# Patient Record
Sex: MALE | Race: BLACK OR AFRICAN AMERICAN | NOT HISPANIC OR LATINO | RURAL
[De-identification: names, ages, dates, MRNs, and addresses within clinical notes are randomized per-mention and may not be internally consistent; named-entity substitution may affect disease eponyms.]

---

## 2021-12-21 DIAGNOSIS — M54.16 LUMBAR RADICULOPATHY: Primary | ICD-10-CM

## 2022-01-04 ENCOUNTER — HOSPITAL ENCOUNTER (EMERGENCY)
Facility: HOSPITAL | Age: 67
Discharge: HOME | End: 2022-01-04
Attending: SPECIALIST
Payer: MEDICARE

## 2022-01-04 VITALS
HEART RATE: 50 BPM | RESPIRATION RATE: 20 BRPM | HEIGHT: 72 IN | TEMPERATURE: 98 F | SYSTOLIC BLOOD PRESSURE: 123 MMHG | WEIGHT: 195 LBS | OXYGEN SATURATION: 99 % | DIASTOLIC BLOOD PRESSURE: 61 MMHG | BODY MASS INDEX: 26.41 KG/M2

## 2022-01-04 DIAGNOSIS — R55 SYNCOPE: ICD-10-CM

## 2022-01-04 DIAGNOSIS — R55 VASOVAGAL SYNCOPE: Primary | ICD-10-CM

## 2022-01-04 LAB
ALBUMIN SERPL BCP-MCNC: 3.4 G/DL (ref 3.5–5)
ALBUMIN/GLOB SERPL: 0.9 {RATIO}
ALP SERPL-CCNC: 100 U/L (ref 45–115)
ALT SERPL W P-5'-P-CCNC: 24 U/L (ref 16–61)
ANION GAP SERPL CALCULATED.3IONS-SCNC: 12 MMOL/L (ref 7–16)
AST SERPL W P-5'-P-CCNC: 20 U/L (ref 15–37)
BASOPHILS # BLD AUTO: 0.06 K/UL (ref 0–0.2)
BASOPHILS NFR BLD AUTO: 0.7 % (ref 0–1)
BILIRUB SERPL-MCNC: 0.6 MG/DL (ref 0–1.2)
BUN SERPL-MCNC: 12 MG/DL (ref 7–18)
BUN/CREAT SERPL: 9 (ref 6–20)
CALCIUM SERPL-MCNC: 8.9 MG/DL (ref 8.5–10.1)
CHLORIDE SERPL-SCNC: 102 MMOL/L (ref 98–107)
CO2 SERPL-SCNC: 29 MMOL/L (ref 21–32)
CREAT SERPL-MCNC: 1.32 MG/DL (ref 0.7–1.3)
DIFFERENTIAL METHOD BLD: ABNORMAL
EOSINOPHIL # BLD AUTO: 0.12 K/UL (ref 0–0.5)
EOSINOPHIL NFR BLD AUTO: 1.4 % (ref 1–4)
ERYTHROCYTE [DISTWIDTH] IN BLOOD BY AUTOMATED COUNT: 15.8 % (ref 11.5–14.5)
GLOBULIN SER-MCNC: 3.8 G/DL (ref 2–4)
GLUCOSE SERPL-MCNC: 141 MG/DL (ref 74–106)
HCT VFR BLD AUTO: 48.7 % (ref 40–54)
HGB BLD-MCNC: 15.4 G/DL (ref 13.5–18)
IMM GRANULOCYTES # BLD AUTO: 0.03 K/UL (ref 0–0.04)
IMM GRANULOCYTES NFR BLD: 0.4 % (ref 0–0.4)
LYMPHOCYTES # BLD AUTO: 1.65 K/UL (ref 1–4.8)
LYMPHOCYTES NFR BLD AUTO: 19.7 % (ref 27–41)
MAGNESIUM SERPL-MCNC: 2.3 MG/DL (ref 1.7–2.3)
MCH RBC QN AUTO: 27.4 PG (ref 27–31)
MCHC RBC AUTO-ENTMCNC: 31.6 G/DL (ref 32–36)
MCV RBC AUTO: 86.7 FL (ref 80–96)
MONOCYTES # BLD AUTO: 0.64 K/UL (ref 0–0.8)
MONOCYTES NFR BLD AUTO: 7.7 % (ref 2–6)
MPC BLD CALC-MCNC: 11.3 FL (ref 9.4–12.4)
NEUTROPHILS # BLD AUTO: 5.86 K/UL (ref 1.8–7.7)
NEUTROPHILS NFR BLD AUTO: 70.1 % (ref 53–65)
PLATELET # BLD AUTO: 145 K/UL (ref 150–400)
POTASSIUM SERPL-SCNC: 4 MMOL/L (ref 3.5–5.1)
PROT SERPL-MCNC: 7.2 G/DL (ref 6.4–8.2)
RBC # BLD AUTO: 5.62 M/UL (ref 4.6–6.2)
SODIUM SERPL-SCNC: 139 MMOL/L (ref 136–145)
TROPONIN I SERPL HS-MCNC: 4.6 PG/ML
WBC # BLD AUTO: 8.36 K/UL (ref 4.5–11)

## 2022-01-04 PROCEDURE — 93010 ELECTROCARDIOGRAM REPORT: CPT | Performed by: SPECIALIST

## 2022-01-04 PROCEDURE — 84484 ASSAY OF TROPONIN QUANT: CPT | Performed by: SPECIALIST

## 2022-01-04 PROCEDURE — 83735 ASSAY OF MAGNESIUM: CPT | Performed by: SPECIALIST

## 2022-01-04 PROCEDURE — 36415 COLL VENOUS BLD VENIPUNCTURE: CPT | Performed by: SPECIALIST

## 2022-01-04 PROCEDURE — 93005 ELECTROCARDIOGRAM TRACING: CPT

## 2022-01-04 PROCEDURE — 99285 EMERGENCY DEPT VISIT HI MDM: CPT | Mod: 25

## 2022-01-04 PROCEDURE — 80053 COMPREHEN METABOLIC PANEL: CPT | Performed by: SPECIALIST

## 2022-01-04 PROCEDURE — 85025 COMPLETE CBC W/AUTO DIFF WBC: CPT | Performed by: SPECIALIST

## 2022-01-04 PROCEDURE — 99284 EMERGENCY DEPT VISIT MOD MDM: CPT | Performed by: SPECIALIST

## 2022-01-04 RX ORDER — LIDOCAINE 50 MG/G
PATCH TOPICAL
COMMUNITY
Start: 2021-08-02

## 2022-01-04 RX ORDER — GABAPENTIN 300 MG/1
CAPSULE ORAL
COMMUNITY
Start: 2021-08-02

## 2022-01-04 RX ORDER — ASPIRIN 81 MG/1
TABLET ORAL
COMMUNITY
Start: 2021-08-03

## 2022-01-04 RX ORDER — ACETAMINOPHEN AND CODEINE PHOSPHATE 300; 30 MG/1; MG/1
TABLET ORAL
COMMUNITY
Start: 2021-12-10

## 2022-01-04 RX ORDER — INSULIN DEGLUDEC 200 U/ML
INJECTION, SOLUTION SUBCUTANEOUS
COMMUNITY
Start: 2021-11-16

## 2022-01-04 RX ORDER — EMPAGLIFLOZIN 25 MG/1
TABLET, FILM COATED ORAL
COMMUNITY
Start: 2021-12-07

## 2022-01-04 RX ORDER — ALOGLIPTIN 25 MG/1
TABLET, FILM COATED ORAL
COMMUNITY
Start: 2021-10-22

## 2022-01-04 RX ORDER — LINACLOTIDE 145 UG/1
CAPSULE, GELATIN COATED ORAL
COMMUNITY
Start: 2021-09-21

## 2022-01-04 RX ORDER — LISINOPRIL AND HYDROCHLOROTHIAZIDE 20; 25 MG/1; MG/1
TABLET ORAL
COMMUNITY
Start: 2021-11-09

## 2022-01-04 RX ORDER — CHOLECALCIFEROL (VITAMIN D3) 25 MCG
TABLET ORAL
COMMUNITY
Start: 2021-12-07

## 2022-01-04 NOTE — ED PROVIDER NOTES
Encounter Date: 1/4/2022       History     Chief Complaint   Patient presents with    Loss of Consciousness     C/O SYNCOPAL EPISODE WHILE ON TOILET-BECAME NAUSEATED AND BROKE OUT IN SWEAT -SPOUSE FOUND ON TOILET      Patient is a 67 yo AA male who was sitting on the toilet having a bowel movement when he fainted.  He was brought in by EMS for evaluation and treatment. Patient has a history of stroke.  Wife is in the room with patient.  He denies any preceding chest pain or n/v or dizziness.  He does not remember exactly what occurred prior to incident.  Patient appears well and is in NAD.        Review of patient's allergies indicates:  No Known Allergies  Past Medical History:   Diagnosis Date    Diabetes mellitus     Hypertension     Stroke      Past Surgical History:   Procedure Laterality Date    CHOLECYSTECTOMY       History reviewed. No pertinent family history.  Social History     Tobacco Use    Smoking status: Never Smoker    Smokeless tobacco: Never Used   Substance Use Topics    Alcohol use: Never    Drug use: Never     Review of Systems   Constitutional: Negative.    HENT: Negative.    Eyes: Negative.    Respiratory: Negative.    Cardiovascular: Negative.    Gastrointestinal: Negative.    Endocrine: Negative.    Genitourinary: Negative.    Musculoskeletal: Negative.    Allergic/Immunologic: Negative.    Neurological: Positive for syncope.   Hematological: Negative.    Psychiatric/Behavioral: Negative.        Physical Exam     Initial Vitals [01/04/22 1310]   BP Pulse Resp Temp SpO2   133/73 67 20 98.2 °F (36.8 °C) 99 %      MAP       --         Physical Exam    Constitutional: He appears well-developed and well-nourished. He is not diaphoretic. No distress.   HENT:   Head: Normocephalic and atraumatic.   Eyes: EOM are normal. Pupils are equal, round, and reactive to light.   Neck: Neck supple.   Normal range of motion.  Cardiovascular: Normal rate.   Pulmonary/Chest: Breath sounds normal.    Musculoskeletal:         General: Normal range of motion.      Cervical back: Normal range of motion and neck supple.     Neurological: He is alert and oriented to person, place, and time. He has normal strength.   Skin: Skin is warm.   Psychiatric: He has a normal mood and affect. His behavior is normal.         Medical Screening Exam   See Full Note    ED Course   Procedures  Labs Reviewed   CBC W/ AUTO DIFFERENTIAL    Narrative:     The following orders were created for panel order CBC auto differential.  Procedure                               Abnormality         Status                     ---------                               -----------         ------                     CBC with Differential[[id number]]                                                         Please view results for these tests on the individual orders.   COMPREHENSIVE METABOLIC PANEL   MAGNESIUM   TROPONIN I   CBC WITH DIFFERENTIAL   URINALYSIS, REFLEX TO URINE CULTURE   DRUG SCREEN, URINE (BEAKER)        ECG Results          EKG 12-lead (In process)  Result time 01/04/22 13:36:13    In process by Interface, Lab In [facility] (01/04/22 13:36:13)                 Narrative:    Test Reason : R55,    Vent. Rate : 055 BPM     Atrial Rate : 055 BPM     P-R Int : 152 ms          QRS Dur : 094 ms      QT Int : 398 ms       P-R-T Axes : 065 073 077 degrees     QTc Int : 380 ms    Sinus bradycardia  Otherwise normal ECG  No previous ECGs available    Referred By: [email or web address]   SELF           Confirmed By:                             Imaging Results          CT Head Without Contrast (Final result)  Result time 01/04/22 13:38:47    Final result by Diogenes Montaño MD (01/04/22 13:38:47)                 Impression:      No acute intracranial abnormality.      Electronically signed by: Diogenes Montaño  Date:    01/04/2022  Time:    13:38             Narrative:    EXAMINATION:  CT HEAD WITHOUT CONTRAST    CLINICAL HISTORY:  syncope;    TECHNIQUE:  Low dose axial  images were obtained through the head.  Coronal and sagittal reformations were also performed. Contrast was not administered.    COMPARISON:  None.    FINDINGS:  No acute intracranial hemorrhage identified.  No mass effect or midline shift.  No large vessel infarct.  Mild chronic microvascular ischemic changes are seen.  Vascular calcifications.  Calvarium intact.  Mastoid air cells clear.                                 Medications - No data to display                    Clinical Impression:   Final diagnoses:  [R55] Syncope  [R55] Vasovagal syncope (Primary)          ED Disposition Condition    Discharge Stable        ED Prescriptions     None        Follow-up Information     Follow up With Specialties Details Why Contact Info    Brent Roy MD Family Medicine In 1 day If symptoms worsen [address] [facility] [address]  [phone]             Stefanie Mendoza MD  01/04/22 0520

## 2022-01-05 ENCOUNTER — CLINICAL SUPPORT (OUTPATIENT)
Dept: REHABILITATION | Facility: HOSPITAL | Age: 67
End: 2022-01-05
Payer: MEDICARE

## 2022-01-05 ENCOUNTER — TELEPHONE (OUTPATIENT)
Dept: EMERGENCY MEDICINE | Facility: HOSPITAL | Age: 67
End: 2022-01-05
Payer: MEDICARE

## 2022-01-05 DIAGNOSIS — G89.29 CHRONIC BILATERAL LOW BACK PAIN WITH LEFT-SIDED SCIATICA: ICD-10-CM

## 2022-01-05 DIAGNOSIS — M54.16 LUMBAR RADICULOPATHY: ICD-10-CM

## 2022-01-05 DIAGNOSIS — M54.42 CHRONIC BILATERAL LOW BACK PAIN WITH LEFT-SIDED SCIATICA: ICD-10-CM

## 2022-01-05 PROBLEM — M54.50 LOW BACK PAIN: Status: ACTIVE | Noted: 2022-01-05

## 2022-01-05 PROCEDURE — 97140 MANUAL THERAPY 1/> REGIONS: CPT

## 2022-01-05 PROCEDURE — 97161 PT EVAL LOW COMPLEX 20 MIN: CPT

## 2022-01-05 PROCEDURE — 97110 THERAPEUTIC EXERCISES: CPT

## 2022-01-05 PROCEDURE — 97014 ELECTRIC STIMULATION THERAPY: CPT

## 2022-01-05 NOTE — PLAN OF CARE
[facility]   Physical Therapy Initial Evaluation    Name: Alen Lam  Clinic Number: [id number]    Therapy Diagnosis: No diagnosis found.  Physician: Carlota Murray FNP    Physician Orders: PT Eval and Treat   Medical Diagnosis from Referral: low back pain   Evaluation Date: 1/5/2022  Authorization Period Expiration: 1/5/2023  Plan of Care Expiration: 2/16/2022  Visit # / Visits authorized: 1/ 12    Time In: 8:10  Time Out: 9:19  Total Appointment Time (timed & untimed codes): 69 minutes    Precautions: Standard    Subjective   Date of onset: chronic   History of current condition - Alen reports: chronic history of low back pain for multiple years. He states that he receives injections in his low back and they help his pain; however, the injections are starting to not last as long. Patient reports increased intensity of pain and increased radiating pain in LLE that has begun to limit his activity tolerance. Patient is scheduled for another injection in 3 months. Return to MD date: March 2022     Medical History:   Past Medical History:   Diagnosis Date    Diabetes mellitus     Hypertension     Stroke        Surgical History:   Alen Lam  has a past surgical history that includes Cholecystectomy.    Medications:   Alen has a current medication list which includes the following prescription(s): acetaminophen-codeine 300-30mg, alogliptin, aspirin, gabapentin, jardiance, lidocaine, linzess, lisinopril-hydrochlorothiazide, tresiba flextouch u-200, and vitamin d.    Allergies:   Review of patient's allergies indicates:  No Known Allergies     Imaging, X-rays    Prior Therapy: Physical Therapy completed at this facility in the past for low back pain   Social History:  lives with their family  Occupation: Disabled   Prior Level of Function: Independent   Current Level of Function: Independent with pain     Pain:  Current 5/10, worst 10/10, best 2/10   Location: low back and L LE   Description:  "Aching and Sharp  Aggravating Factors: Sitting, Standing, Bending and Walking  Easing Factors: pain medication and injection    Pts goals: decrease pain to increase activity tolerance     Objective     Posture:  1. Standing lordosis: Decreased  2. Sitting lordosis: Decreased  3. Iliac crest height: left increased  4. PSIS height: left increased  5. Pelvic rotation/torsion: yes  6. Scoliosis: no  7. Lateral shift: None     Forward bend:WFL with pain   Back bend: WFL with pain   Lateral trunk lean: right decreased by approximately 50% due to pain and muscle tightness left decreased by 35%       MANUAL MUSCLE TEST  Right left   Hip flexion MMT number: 4/5 MMT strength: 4-/5   Hip abduction MMT strength: 4+/5 MMT strength: 4-/5   Hip Extension  MMT number: 4-/5 MMT number: 3+/5     ROM/flexibility right left   Hamstring 90/90 (-10) -10  -10      Special test Right  Left    SLR test < 60 degrees Negative Negative   SLR test > 60 degrees Positive for pain in HS only  Positive   Sitting slump test Positive Positive   Piriformis test Positive Positive   JOURDAN test Positive Positive   SI distraction Negative Negative   SI compression Positive Positive     Gait assessment:   Antalgic gait: yes  Assistive device: none    Spinal mobility:  Segment: Lumbar and sacral mobility decreased with posterior/anterior mobilization     Palpation: Muscle tightness and pain with palpation of B QL muscles and lumbar paraspinals, L piriformis muscle tightness and pain on palpation       TREATMENT   Treatment Time In: 8:22  Treatment Time Out: 9:19  Total Treatment time (time-based codes) separate from Evaluation:57  minutes    Corral received the treatments listed below:  THERAPEUTIC EXERCISES to develop flexibility, posture and core stabilization for 27 minutes including :See flowsheet below    Ther-Ex Reps    Nustep 8 mins    Wedge 1 mins    Hamstring Stretch 2 x 20" each    Posterior Pelvic Tilts 20 x 3"    Lower Trunk Rotations 5 x 10" " "each side   Single Knee to Chest Stretch 5 x 10" each side    Piriformis Stretch 2 x 30" each side    Figure 4 Stretch 2 x 30" each side    Hip Adduction -----   Hip Abduction  ------                       MANUAL THERAPY TECHNIQUES including Joint mobilizations and active neuromuscular releases  were applied to L innominate, L QL, L piriformis, and lumbar spine  for 18 minutes to improve pelvic alignment and lumbar mobility.     SUPERVISED MODALITIES after being cleared for contraindications:  TENS:  Alen received Biphasic electrical stimulation for pain to the B lumbosacral muscles. Pt received burst mode at a rate of 2 pps for 12 minutes. Alen tolerated treatment well without any adverse effects.     Hot Pack for 12 minutes to B lumbosacral muscles with ESTIM.    Home Exercises and Patient Education Provided        Education provided:   - PT provided education on body mechanics, activity modifications, and sleeping positions to decrease pain.     Written Home Exercises Provided: yes.  Exercises were reviewed and Alen was able to demonstrate them prior to the end of the session.  Alen demonstrated good  understanding of the education provided.     See EMR under Patient Instructions for exercises provided 1/5/2022.    Assessment   Alen is a 66 y.o. male referred to outpatient Physical Therapy with a medical diagnosis of lumbar radiculopathy. Pt presents with low back and L LE pain, muscle tightness, and LE and core muscle weakness.Patient's impairments have begun to limit his overall mobility and activity tolerance to ADLs.   Pt prognosis is Fair.   Pt will benefit from skilled outpatient Physical Therapy to address the deficits stated above and in the chart below, provide pt/family education, and to maximize pt's level of independence.     PT provided verbal, visual, and tactile cueing for posture, form and correct hold times with ther-ex today. Patient presents with limited hip mobility B (L>R). Patient " had no reports of increased pain with ther-ex today only reports of tissue stretching. PT noted improved pelvic alignment, spinal mobility, and decreased tissue tightness following manual therapy. Patient reported pain of 2/10 at end of treatment session.     Plan of care discussed with patient: Yes  Pt's spiritual, cultural and educational needs considered and patient is agreeable to the plan of care and goals as stated below:     Anticipated Barriers for therapy: chronicity of pain, guarded with exercise, and sedentary lifestyle.      Goals:  1. Patient will report decrease in pain to 3/10 to improve quality of life.   2. Patient will report decrease in radiating occurrences from L foot  to L thigh  to allow patient the ability to perform activities of daily living   3. Patient will increase B lower extremity strength to 4+/5 to improve ambulation ability  4. Patient will increase B hamstring 90/90 by 10 degrees      Plan   Plan of care Certification: 1/5/2022 to 2/16/2022     Outpatient Physical Therapy 2 times weekly for 6 weeks to include the following interventions: Electrical Stimulation unattend, Manual Therapy, Moist Heat/ Ice, Patient Education, Therapeutic Activities, Therapeutic Exercise and Ultrasound.     Flaquito Lobo, PT,DPT     Physician Signature : ____________________________________________________  Date:_______________________________________________

## 2022-01-11 ENCOUNTER — CLINICAL SUPPORT (OUTPATIENT)
Dept: REHABILITATION | Facility: HOSPITAL | Age: 67
End: 2022-01-11
Payer: MEDICARE

## 2022-01-11 DIAGNOSIS — G89.29 CHRONIC BILATERAL LOW BACK PAIN WITH LEFT-SIDED SCIATICA: ICD-10-CM

## 2022-01-11 DIAGNOSIS — M54.42 CHRONIC BILATERAL LOW BACK PAIN WITH LEFT-SIDED SCIATICA: ICD-10-CM

## 2022-01-11 PROCEDURE — 97140 MANUAL THERAPY 1/> REGIONS: CPT | Mod: CQ

## 2022-01-11 PROCEDURE — 97110 THERAPEUTIC EXERCISES: CPT | Mod: CQ

## 2022-01-11 PROCEDURE — 97014 ELECTRIC STIMULATION THERAPY: CPT | Mod: CQ

## 2022-01-11 NOTE — PROGRESS NOTES
"[facility]   Physical Therapy Treatment Note     Name: Alen Lam  Clinic Number: [id number]    Therapy Diagnosis:   Encounter Diagnosis   Name Primary?    Chronic bilateral low back pain with left-sided sciatica      Physician: Carlota Murray FNP  Physician Orders: PT Eval and Treat   Medical Diagnosis from Referral: low back pain   Evaluation Date: 1/5/2022  Authorization Period Expiration: 1/5/2023  Plan of Care Expiration: 2/16/2022  Visit # / Visits authorized: 2/12     Time In: 14:02  Time Out:  15:15  Total Appointment Time (timed & untimed codes): 73 minutes      Precautions: Standard    Visit Date: 1/11/2022    PTA Visit #: 1/5        SUBJECTIVE     Pt reports: Patient stated that he is experiencing some low back pain today and he has been feel fatigue from medication he has been taking. The pt rated his pain 7/10.      He was compliant with home exercise program.  Response to previous treatment: Relief post eval and treatment   Functional change:  Early in POC     Pain: 7/10  Location: low back     OBJECTIVE     Objective Measures updated at progress report unless specified.     Treatment     Alen received the treatments listed below:      THERAPEUTIC EXERCISES to develop flexibility, posture and core stabilization.   See flowsheet below: Added Seated 3 way roll w/ Red thera ball.    Ther-Ex Reps    Nustep 10 mins   Wedge 2 minutes    Hamstring Stretch 2 x 20" each    Posterior Pelvic Tilts 20 x 3"    Lower Trunk Rotations 5 x 10" each side   Single Knee to Chest Stretch 5 x 10" each side    Piriformis Stretch 2 x 30" each side    Figure 4 Stretch 2 x 30" each side    Hip Adduction -----   Hip Abduction  ------   Seated 3 way roll w/ thera ball  5 x 10" each side*                           MANUAL THERAPY TECHNIQUES:  ANR's to decrease muscle tightness and trigger points in Left Quadratus Lumborum, Left Glut Min, and Left Piriformis followed by muscle energy technique to " correct Right pelvic anterior rotation and Left  Pelvic Up-slip.       SUPERVISED MODALITIES after being cleared for contraindications:  TENS:  Alen received Biphasic electrical stimulation for pain to the B lumbosacral muscles. Pt received burst mode at a rate of 2 pps for 15 minutes. Alen tolerated treatment well without any adverse effects.      Hot Pack for 15 minutes to B lumbosacral muscles with ESTIM.      Patient Education and Home Exercises     Home Exercises Provided and Patient Education Provided     Education provided: Post treatment soreness and preventative measures.       Written Home Exercises Provided: Patient instructed to cont prior HEP. Exercises were reviewed and Alen was able to demonstrate them prior to the end of the session.  Alen demonstrated fair  understanding of the education provided. See EMR under Patient Instructions for exercises provided during therapy sessions    ASSESSMENT     Alen is a 66 y.o. male referred to outpatient Physical Therapy with a medical diagnosis of lumbar radiculopathy. Pt presents with low back and L LE pain, muscle tightness, and LE and core muscle weakness. Before   Treatment, pt reported feeling fatigued, but pt the was cooperative in  therapeutic exercises and required min assist in manual therapy. With palpation, LPTA noted Right anterior pelvic rotation  and Left Pelvic Up-slip which was corrected with mod effort manually.  Patient rates low back pain was decreased to 0/10 at end of treatment session when transitioning from treatment table and during ambulation.      Pt prognosis is Fair.     Pt will continue to benefit from skilled outpatient physical therapy to address the deficits listed in the problem list box on initial evaluation, provide pt/family education and to maximize pt's level of independence in the home and community environment.     Pt's spiritual, cultural and educational needs considered and pt agreeable to plan of care and  goals.     Anticipated barriers to physical therapy: chronicity of pain, guarded with exercise, and sedentary lifestyle.    Goals:  1. Patient will report decrease in pain to 3/10 to improve quality of life.   2. Patient will report decrease in radiating occurrences from L foot  to L thigh  to allow patient the ability to perform activities of daily living   3. Patient will increase B lower extremity strength to 4+/5 to improve ambulation ability  4. Patient will increase B hamstring 90/90 by 10 degrees      PLAN     Continue POC per PT order to progress patient toward rehab goals as tolerated by patient.    Yumiko Fernando, PTA 1/11/2022

## 2022-01-13 ENCOUNTER — CLINICAL SUPPORT (OUTPATIENT)
Dept: REHABILITATION | Facility: HOSPITAL | Age: 67
End: 2022-01-13
Payer: MEDICARE

## 2022-01-13 DIAGNOSIS — G89.29 CHRONIC BILATERAL LOW BACK PAIN WITH LEFT-SIDED SCIATICA: ICD-10-CM

## 2022-01-13 DIAGNOSIS — M54.42 CHRONIC BILATERAL LOW BACK PAIN WITH LEFT-SIDED SCIATICA: ICD-10-CM

## 2022-01-13 PROCEDURE — 97140 MANUAL THERAPY 1/> REGIONS: CPT | Mod: CQ

## 2022-01-13 PROCEDURE — 97110 THERAPEUTIC EXERCISES: CPT | Mod: CQ

## 2022-01-13 NOTE — PROGRESS NOTES
"[facility]   Physical Therapy Treatment Note     Name: Alen Lam  Clinic Number: [id number]    Therapy Diagnosis:   Encounter Diagnosis   Name Primary?    Chronic bilateral low back pain with left-sided sciatica      Physician: Carlota Murray FNP  Physician Orders: PT Eval and Treat   Medical Diagnosis from Referral: low back pain   Evaluation Date: 1/5/2022  Authorization Period Expiration: 1/5/2023  Plan of Care Expiration: 2/16/2022  Visit # / Visits authorized: 3/12     Time In: 10:10  Time Out:  11:05  Total Appointment Time (timed & untimed codes): 55 minutes      Precautions: Standard    Visit Date: 1/13/2022    PTA Visit #: 2/5        SUBJECTIVE     Pt reports: Upon arrival to physical therapy, "The back of my leg is real sore".  Patient denies pain in low back and hips.      He was compliant with home exercise program.  Response to previous treatment: Relief post eval and treatment   Functional change:  Early in POC     Pain: 6/10; 01/0   Location:  L LE posterior; Low back and bilateral hips     OBJECTIVE     Objective Measures updated at progress report unless specified.     Treatment     Alen received the treatments listed below:      THERAPEUTIC EXERCISES to develop flexibility, posture and core stabilization.   See flowsheet below:     Ther-Ex Reps    Nustep 10 mins   Wedge 2 minutes    Hamstring Stretch 3 x 30"  each    Posterior Pelvic Tilts 30 x 3" *    Lower Trunk Rotations 5 x 10" each side   Single Knee to Chest Stretch 5 x 10" each side    Piriformis Stretch 3 x 30" each side    Figure 4 Stretch 3 x 30" each side    Hip Adduction -----   Hip Abduction  ------   Seated 3 way roll w/ thera ball  5 x 10" each side   Supine Hamstring Stretch  3 x 30" using Stretch Rite *                       SUPERVISED MODALITIES after being cleared for contraindications:  TENS:  Alen received Biphasic electrical stimulation for pain to the B lumbosacral muscles. Pt received " "burst mode at a rate of 2 pps for 12 minutes.      Hot Pack for 12 minutes to B lumbosacral muscles with ESTIM.      Patient Education and Home Exercises     Home Exercises Provided and Patient Education Provided     Education provided: Post treatment soreness and preventative measures.       Written Home Exercises Provided: Patient instructed to cont prior HEP. Exercises were reviewed and Alen was able to demonstrate them prior to the end of the session.  [place] demonstrated fair  understanding of the education provided. See EMR under Patient Instructions for exercises provided during therapy sessions    ASSESSMENT     Alen is a 66 y.o. male referred to outpatient Physical Therapy with a medical diagnosis of lumbar radiculopathy. Pt presents with low back and L LE pain, muscle tightness, and LE and core muscle weakness. Added supine Hamstring stretches with patient using Stretch Rite belt to assist with mod cues from LPTA for patient to maintain proper form and alignment.  Patient progress through treatment session without complaints of increased pain, but states "I can feel it pulling in my leg (L)" with LE stretches.  No manual therapy today secondary to no pelvic mal-alignment noted.  Patient without complaints of pain at end of physical therapy treatment session post Ther Ex and modalities.  Patient rates pain 0/10, and states "I feel good".        Pt prognosis is Fair.     Pt will continue to benefit from skilled outpatient physical therapy to address the deficits listed in the problem list box on initial evaluation, provide pt/family education and to maximize pt's level of independence in the home and community environment.     Pt's spiritual, cultural and educational needs considered and pt agreeable to plan of care and goals.     Anticipated barriers to physical therapy: chronicity of pain, guarded with exercise, and sedentary lifestyle.    Goals:  1. Patient will report decrease in pain to 3/10 to " improve quality of life.   2. Patient will report decrease in radiating occurrences from L foot  to L thigh  to allow patient the ability to perform activities of daily living   3. Patient will increase B lower extremity strength to 4+/5 to improve ambulation ability  4. Patient will increase B hamstring 90/90 by 10 degrees      PLAN     Continue POC per PT order to progress patient toward rehab goals as tolerated by patient.    Yumiko Fernando, PTA 1/13/2022

## 2022-01-18 ENCOUNTER — CLINICAL SUPPORT (OUTPATIENT)
Dept: REHABILITATION | Facility: HOSPITAL | Age: 67
End: 2022-01-18
Payer: MEDICARE

## 2022-01-18 DIAGNOSIS — G89.29 CHRONIC BILATERAL LOW BACK PAIN WITH LEFT-SIDED SCIATICA: ICD-10-CM

## 2022-01-18 DIAGNOSIS — M54.42 CHRONIC BILATERAL LOW BACK PAIN WITH LEFT-SIDED SCIATICA: ICD-10-CM

## 2022-01-18 PROCEDURE — 97014 ELECTRIC STIMULATION THERAPY: CPT | Mod: CQ

## 2022-01-18 PROCEDURE — 97110 THERAPEUTIC EXERCISES: CPT | Mod: CQ

## 2022-01-18 NOTE — PROGRESS NOTES
"[facility]   Physical Therapy Treatment Note     Name: Alen Lam  Clinic Number: [id number]    Therapy Diagnosis:   Encounter Diagnosis   Name Primary?    Chronic bilateral low back pain with left-sided sciatica      Physician: Carlota Murray FNP  Physician Orders: PT Eval and Treat   Medical Diagnosis from Referral: low back pain   Evaluation Date: 1/5/2022  Authorization Period Expiration: 1/5/2023  Plan of Care Expiration: 2/16/2022  Visit # / Visits authorized: 4/12     Time In: 10:05  Time Out:  11:06  Total Appointment Time (timed & untimed codes): 61 minutes      Precautions: Standard    Visit Date: 1/18/2022    PTA Visit #: 3/5        SUBJECTIVE     Pt reports: Patient stated that he had no pain prior to physical therapy treatment.    He was compliant with home exercise program.  Response to previous treatment: No adverse effects noted  Functional change: Patient stated, "I'm not having as much back pain as I used to."    Pain: 0/10  Location: Low back and bilateral hips     OBJECTIVE     Objective Measures updated at progress report unless specified.     Treatment     Alen received the treatments listed below:      THERAPEUTIC EXERCISES to develop flexibility, posture and core stabilization. See flowsheet below:     Ther-Ex Reps    Nustep 10 mins   Wedge 2 minutes    Hamstring Stretch 3 x 30"  each    Posterior Pelvic Tilts 30 x 3"    Lower Trunk Rotations 5 x 10" each side   Single Knee to Chest Stretch 5 x 10" each side    Piriformis Stretch 3 x 30" each side    Figure 4 Stretch 3 x 30" each side    Hip Adduction -----   Hip Abduction  -----   Seated 3 way roll w/ thera ball 5 x 10" each side   Supine Hamstring Stretch  3 x 30" using Stretch Rite                       SUPERVISED MODALITIES after being cleared for contraindications: TENS: Alen received Biphasic electrical stimulation for post exercises irritation to the B lumbosacral muscles. Pt received burst mode " "at a rate of 2 pps for 10 minutes.      Hot Pack for 10 minutes to B lumbosacral muscles with ESTIM.      Patient Education and Home Exercises     Home Exercises Provided and Patient Education Provided       Written Home Exercises Provided: Patient instructed to cont prior HEP. Exercises were reviewed and Alen was able to demonstrate them prior to the end of the session.  Nipomo demonstrated fair  understanding of the education provided. See EMR under Patient Instructions for exercises provided during therapy sessions    ASSESSMENT     Alen is a 66 y.o. male referred to outpatient Physical Therapy with a medical diagnosis of lumbar radiculopathy. Pt presents with low back and L LE pain, muscle tightness, and LE and core muscle weakness. Visual, verbal, and tactile cues needed for correct form and hold times of therapeutic exercises. Patient progress through treatment session without complaints of increased pain, but states "I can really feel it" while performing LLE stretches. Occasional rest breaks needed due to fatigue. Patient stated that he had no pain after physical therapy treatment session.    Pt prognosis is Fair.     Pt will continue to benefit from skilled outpatient physical therapy to address the deficits listed in the problem list box on initial evaluation, provide pt/family education and to maximize pt's level of independence in the home and community environment.     Pt's spiritual, cultural and educational needs considered and pt agreeable to plan of care and goals.     Anticipated barriers to physical therapy: chronicity of pain, guarded with exercise, and sedentary lifestyle.    Goals:  1. Patient will report decrease in pain to 3/10 to improve quality of life.   2. Patient will report decrease in radiating occurrences from L foot  to L thigh  to allow patient the ability to perform activities of daily living   3. Patient will increase B lower extremity strength to 4+/5 to improve ambulation " ability  4. Patient will increase B hamstring 90/90 by 10 degrees      PLAN     Continue POC per PT order to progress patient toward rehab goals as tolerated by patient.    Alina Hanley, PTA 1/18/2022

## 2022-01-25 ENCOUNTER — CLINICAL SUPPORT (OUTPATIENT)
Dept: REHABILITATION | Facility: HOSPITAL | Age: 67
End: 2022-01-25
Payer: MEDICARE

## 2022-01-25 DIAGNOSIS — G89.29 CHRONIC BILATERAL LOW BACK PAIN WITH LEFT-SIDED SCIATICA: ICD-10-CM

## 2022-01-25 DIAGNOSIS — M54.42 CHRONIC BILATERAL LOW BACK PAIN WITH LEFT-SIDED SCIATICA: ICD-10-CM

## 2022-01-25 PROCEDURE — 97014 ELECTRIC STIMULATION THERAPY: CPT | Mod: CQ

## 2022-01-25 PROCEDURE — 97110 THERAPEUTIC EXERCISES: CPT | Mod: CQ

## 2022-01-25 PROCEDURE — 97140 MANUAL THERAPY 1/> REGIONS: CPT | Mod: CQ

## 2022-01-25 NOTE — PROGRESS NOTES
"[facility]   Physical Therapy Treatment Note     Name: Alen Lam  Clinic Number: [id number]    Therapy Diagnosis:   No diagnosis found.  Physician: Carlota Murray FNP  Physician Orders: PT Eval and Treat   Medical Diagnosis from Referral: low back pain   Evaluation Date: 1/5/2022  Authorization Period Expiration: 1/5/2023  Plan of Care Expiration: 2/16/2022  Visit # / Visits authorized: 5/12     Time In: 9:09  Time Out:  10:18  Total Appointment Time (timed & untimed codes): 69 minutes     Precautions: Standard    Visit Date: 1/25/2022    PTA Visit #: 4/5        SUBJECTIVE     Pt reports:  Patient reported that he was having some pain on the lateral side of  his L LE. Patient stated " My leg feels sore and that it just does that periodically.  I'm not having any back pain right not though".     He was compliant with home exercise program.  Response to previous treatment: No adverse effects noted  Functional change: Patient stated, "I'm not having as much back pain as I used to."    Pain: 5/10  Location: Left Hip and Left lateral thigh to knee      OBJECTIVE     Objective Measures updated at progress report unless specified.     Treatment     Alen received the treatments listed below:      THERAPEUTIC EXERCISES to develop flexibility, posture and core stabilization. See flowsheet below:     Ther-Ex Reps    Nustep 10 mins   Wedge 2 minutes    Hamstring Stretch 3 x 30"  each    Posterior Pelvic Tilts 30 x 3"    Lower Trunk Rotations 5 x 10" each side   Single Knee to Chest Stretch 5 x 10" each side    Piriformis Stretch 3 x 30" each side    Figure 4 Stretch 3 x 30" each side    Hip Adduction -----   Hip Abduction  -----   Seated 3 way roll w/ thera ball 5 x 10" each side   Supine Hamstring Stretch  3 x 30" using Stretch Rite                       SUPERVISED MODALITIES after being cleared for contraindications: TENS: Alen received Biphasic electrical stimulation for post exercises " irritation to the B lumbosacral muscles. Pt received burst mode at a rate of 2 pps for 10 minutes.      Hot Pack for 10 minutes to B lumbosacral muscles with ESTIM.    MANUAL THERAPY:  STM/MFR to Left IT Band and TFL to decrease adhesions and MYF tightntess.       Patient Education and Home Exercises     Home Exercises Provided and Patient Education Provided:  Instructed patient to apply CP to Left lateral tight to decrease post treatment tenderness and soreness.     Written Home Exercises Provided: Patient instructed to cont prior HEP. Exercises were reviewed and Alen was able to demonstrate them prior to the end of the session.  Alen demonstrated fair  understanding of the education provided. See EMR under Patient Instructions for exercises provided during therapy sessions    ASSESSMENT     Alen is a 66 y.o. male referred to outpatient Physical Therapy with a medical diagnosis of lumbar radiculopathy. Pt presents with low back and L LE pain, muscle tightness, and LE and core muscle weakness. Patient required  min  verbal and tactile cueing for proper postioning, alignment, and hold times while performing therapeutic exercises. Occasional breaks were taken due to LLE pain.  Noted lateral tightness in the patient's TFL on his L LE while performing therapeutic exercises.  After completion of manual therapy to decrease tightness in TFL and IT Band, patient rates pain level decreased to 0/10.  LPTA present to directly supervise SPTA during entirety of treatment session.     Pt will continue to benefit from skilled outpatient physical therapy to address the deficits listed in the problem list box on initial evaluation, provide pt/family education and to maximize pt's level of independence in the home and community environment.     Pt's spiritual, cultural and educational needs considered and pt agreeable to plan of care and goals.     Anticipated barriers to physical therapy: chronicity of pain, guarded with  exercise, and sedentary lifestyle.    Goals:  1. Patient will report decrease in pain to 3/10 to improve quality of life.   2. Patient will report decrease in radiating occurrences from L foot  to L thigh  to allow patient the ability to perform activities of daily living   3. Patient will increase B lower extremity strength to 4+/5 to improve ambulation ability  4. Patient will increase B hamstring 90/90 by 10 degrees      PLAN     Continue POC per PT order to progress patient toward rehab goals as tolerated by patient.    Yumiko Fernando, PTA 1/25/2022   Art Barbosa, SPTA, 1/25/2022\

## 2022-01-27 ENCOUNTER — CLINICAL SUPPORT (OUTPATIENT)
Dept: REHABILITATION | Facility: HOSPITAL | Age: 67
End: 2022-01-27
Payer: MEDICARE

## 2022-01-27 DIAGNOSIS — G89.29 CHRONIC BILATERAL LOW BACK PAIN WITH LEFT-SIDED SCIATICA: ICD-10-CM

## 2022-01-27 DIAGNOSIS — M54.42 CHRONIC BILATERAL LOW BACK PAIN WITH LEFT-SIDED SCIATICA: ICD-10-CM

## 2022-01-27 PROCEDURE — 97014 ELECTRIC STIMULATION THERAPY: CPT | Mod: KX,CQ

## 2022-01-27 PROCEDURE — 97110 THERAPEUTIC EXERCISES: CPT | Mod: KX,CQ

## 2022-01-27 NOTE — PROGRESS NOTES
"[facility]   Physical Therapy Treatment Note     Name: Alen Lam  Clinic Number: [id number]    Therapy Diagnosis:   No diagnosis found.  Physician: Carlota Murray FNP  Physician Orders: PT Eval and Treat   Medical Diagnosis from Referral: low back pain   Evaluation Date: 1/5/2022  Authorization Period Expiration: 1/5/2023  Plan of Care Expiration: 2/16/2022  Visit # / Visits authorized: 6/12     Time In: 8:59  Time Out: 10:02  Total Appointment Time (timed & untimed codes): 63 minutes     Precautions: Standard    Visit Date: 1/27/2022    PTA Visit #: 5/5        SUBJECTIVE     Pt reports: Patient stated that his left leg was better, and stated that he has no pain.     He was compliant with home exercise program.  Response to previous treatment: No adverse effects noted  Functional change: Patient stated, "I'm not having as much back pain as I used to."    Pain: 0/10  Location: Left Hip and Left lateral thigh to knee      OBJECTIVE     Objective Measures updated at progress report unless specified.     Treatment     Alen received the treatments listed below:      THERAPEUTIC EXERCISES to develop flexibility, posture and core stabilization. See flowsheet below: Added hip adduction, and hip abduction    Ther-Ex Reps    Nustep 10 mins   Wedge 2 minutes    Hamstring Stretch 3 x 30"  each    Posterior Pelvic Tilts 30 x 3"    Lower Trunk Rotations 5 x 10" each side   Single Knee to Chest Stretch 5 x 10" each side    Piriformis Stretch 3 x 30" each side    Figure 4 Stretch 3 x 30" each side    Hip Adduction 30 x 3"*   Hip Abduction  20 x red TB*   Seated 3 way roll w/ thera ball 5 x 10" each side (Not today)   Supine Hamstring Stretch  3 x 30" using Stretch Rite                       SUPERVISED MODALITIES after being cleared for contraindications: TENS: Alen received Biphasic electrical stimulation for post exercises irritation to the B lumbosacral muscles. Pt received burst mode at a " rate of 2 pps for 10 minutes.      Hot Pack for 10 minutes to B lumbosacral muscles with ESTIM.       Patient Education and Home Exercises     Home Exercises Provided and Patient Education Provided:       Written Home Exercises Provided: Patient instructed to cont prior HEP. Exercises were reviewed and Alen was able to demonstrate them prior to the end of the session.  Alen demonstrated fair  understanding of the education provided. See EMR under Patient Instructions for exercises provided during therapy sessions    ASSESSMENT     Alen is a 66 y.o. male referred to outpatient Physical Therapy with a medical diagnosis of lumbar radiculopathy. Pt presents with low back and L LE pain, muscle tightness, and LE and core muscle weakness. Patient required min verbal and tactile cueing for proper postioning, alignment, and hold times while performing therapeutic exercises. Occasional breaks were needed due to reports of fatigue. Patient with no reports of increased pain throughout entirety of treatment session. No adverse effects noted after therapy.    Pt's spiritual, cultural and educational needs considered and pt agreeable to plan of care and goals.     Anticipated barriers to physical therapy: chronicity of pain, guarded with exercise, and sedentary lifestyle.    Goals:  1. Patient will report decrease in pain to 3/10 to improve quality of life.   2. Patient will report decrease in radiating occurrences from L foot  to L thigh  to allow patient the ability to perform activities of daily living   3. Patient will increase B lower extremity strength to 4+/5 to improve ambulation ability  4. Patient will increase B hamstring 90/90 by 10 degrees      PLAN     Continue POC per PT order to progress patient toward rehab goals as tolerated by patient.    Alina Hanley, PTA 1/27/2022

## 2022-02-01 ENCOUNTER — CLINICAL SUPPORT (OUTPATIENT)
Dept: REHABILITATION | Facility: HOSPITAL | Age: 67
End: 2022-02-01
Payer: MEDICARE

## 2022-02-01 DIAGNOSIS — M54.42 CHRONIC BILATERAL LOW BACK PAIN WITH LEFT-SIDED SCIATICA: ICD-10-CM

## 2022-02-01 DIAGNOSIS — G89.29 CHRONIC BILATERAL LOW BACK PAIN WITH LEFT-SIDED SCIATICA: ICD-10-CM

## 2022-02-01 PROCEDURE — 97110 THERAPEUTIC EXERCISES: CPT | Mod: CQ

## 2022-02-01 NOTE — PROGRESS NOTES
"[facility]   Physical Therapy Treatment Note     Name: Alen Lam  Clinic Number: [id number]    Therapy Diagnosis:   Encounter Diagnosis   Name Primary?    Chronic bilateral low back pain with left-sided sciatica      Physician: Carlota Murray FNP  Physician Orders: PT Eval and Treat   Medical Diagnosis from Referral: low back pain   Evaluation Date: 1/5/2022  Authorization Period Expiration: 1/5/2023  Plan of Care Expiration: 2/16/2022  Visit # / Visits authorized: 7/12     Time In: 8:56  Time Out: 10:05   Total Appointment Time (timed & untimed codes): 52 billable      Precautions: Standard    Visit Date: 2/1/2022    PTA Visit #: 6/5        SUBJECTIVE     Pt reports: Upon arrival patient reports he had no pain in his LLE this morning and over the weekend. Patient chief complaints is his arm is bothering him. Patient states" I am not sure if i just slept on it wrong or what."        He was compliant with home exercise program.  Response to previous treatment: No adverse effects noted  Functional change: Patient stated, "I'm not having as much back pain as I used to."    Pain: 0/10  Location: Left Hip and Left lateral thigh to knee      OBJECTIVE     Objective Measures updated at progress report unless specified.  Case conference with Flaquito Lobo DPT regarding patient's POC.  LPTA instructed to continue current POC.     Treatment     Alen received the treatments listed below:      THERAPEUTIC EXERCISES to develop flexibility, posture and core stabilization. See flowsheet below: Added Bridges     Ther-Ex Reps    Nustep 10 mins   Wedge 2 minutes    Hamstring Stretch 3 x 30"  each    Posterior Pelvic Tilts 30 x 3"    Lower Trunk Rotations 5 x 10" each side   Single Knee to Chest Stretch 5 x 10" each side    Piriformis Stretch 3 x 30" each side    Figure 4 Stretch 3 x 30" each side    Hip Adduction 30 x 3"*   Hip Abduction  20 x red TB   Seated 3 way roll w/ thera ball 5 x 10" " "each side (Not today)   Supine Hamstring Stretch  3 x 30" using Stretch Rite    Bridges 2 x 10 *                NOT COMPLETED TODAY: SUPERVISED MODALITIES after being cleared for contraindications: TENS: Alen received Biphasic electrical stimulation for post exercises irritation to the B lumbosacral muscles. Pt received burst mode at a rate of 2 pps for 10 minutes.      Hot Pack for 10 minutes to B lumbosacral muscles and B Hamstring muscles.       Patient Education and Home Exercises     Home Exercises Provided and Patient Education Provided:       Written Home Exercises Provided: Patient instructed to cont prior HEP. Exercises were reviewed and Alen was able to demonstrate them prior to the end of the session.  Alen demonstrated fair  understanding of the education provided. See EMR under Patient Instructions for exercises provided during therapy sessions    ASSESSMENT     Alen is a 66 y.o. male referred to outpatient Physical Therapy with a medical diagnosis of lumbar radiculopathy. Pt presents with low back and L LE pain, muscle tightness, and LE and core muscle weakness. Patient was provided with min verbal and tactile cueing for proper alignment and hold times while performing therapeutic exercises. Noted patient presented muscle tightness in B Hamstrings. MHP application to low back and hips at end of treatment session to decrease post treatment muscle tightness.   Patient completed treatment with no adverse effects noted and no increase in pain.        Pt's spiritual, cultural and educational needs considered and pt agreeable to plan of care and goals.     Anticipated barriers to physical therapy: chronicity of pain, guarded with exercise, and sedentary lifestyle.    Goals:  1. Patient will report decrease in pain to 3/10 to improve quality of life.   2. Patient will report decrease in radiating occurrences from L foot  to L thigh  to allow patient the ability to perform activities of daily living "   3. Patient will increase B lower extremity strength to 4+/5 to improve ambulation ability  4. Patient will increase B hamstring 90/90 by 10 degrees      PLAN     Continue POC per PT order to progress patient toward rehab goals as tolerated by patient.    Yumiko Fernando, PTA 2/1/2022   rAt Barbosa, SPTA 2/1/2022

## 2022-02-04 ENCOUNTER — CLINICAL SUPPORT (OUTPATIENT)
Dept: REHABILITATION | Facility: HOSPITAL | Age: 67
End: 2022-02-04
Payer: MEDICARE

## 2022-02-04 DIAGNOSIS — G89.29 CHRONIC BILATERAL LOW BACK PAIN WITH LEFT-SIDED SCIATICA: ICD-10-CM

## 2022-02-04 DIAGNOSIS — M54.42 CHRONIC BILATERAL LOW BACK PAIN WITH LEFT-SIDED SCIATICA: ICD-10-CM

## 2022-02-04 PROCEDURE — 97110 THERAPEUTIC EXERCISES: CPT | Mod: KX,CQ

## 2022-02-04 NOTE — PROGRESS NOTES
"[facility]   Physical Therapy Treatment Note     Name: Alen Lam  Clinic Number: [id number]    Therapy Diagnosis:   No diagnosis found.  Physician: Carlota Murray FNP  Physician Orders: PT Eval and Treat   Medical Diagnosis from Referral: low back pain   Evaluation Date: 1/5/2022  Authorization Period Expiration: 1/5/2023  Plan of Care Expiration: 2/16/2022  Visit # / Visits authorized: 8/12     Time In: 9:59  Time Out: 10:57  Total Appointment Time (timed & untimed codes): 58 billable      Precautions: Standard    Visit Date: 2/4/2022    PTA Visit #: 7/5        SUBJECTIVE     Pt reports: Patient stated that he had no pain upon arrival to treatment session.       He was compliant with home exercise program.  Response to previous treatment: No adverse effects noted  Functional change: Patient stated, "I'm not having as much back pain as I used to."    Pain: 0/10  Location: Left Hip and Left lateral thigh to knee      OBJECTIVE     Objective Measures updated at progress report unless specified. Case conference with Flaquito Lobo DPT regarding patient's POC. LPTA instructed to continue current POC.     Treatment     Alen received the treatments listed below:      THERAPEUTIC EXERCISES to develop flexibility, posture and core stabilization. See flowsheet below:     Ther-Ex Reps    Bike 10 mins   Wedge 2 minutes    Hamstring Stretch 3 x 30"  each    Posterior Pelvic Tilts 30 x 3"    Lower Trunk Rotations 5 x 10" each side   Single Knee to Chest Stretch 5 x 10" each side    Piriformis Stretch 3 x 30" each side    Figure 4 Stretch 3 x 30" each side    Hip Adduction 30 x 3"*   Hip Abduction  20 x 2 red TB   Seated 3 way roll w/ thera ball 5 x 10" each side (Not today)   Supine Hamstring Stretch  3 x 30" using Stretch Rite    Bridges 2 x 10                Patient Education and Home Exercises     Home Exercises Provided and Patient Education Provided:       Written Home Exercises " Provided: Patient instructed to cont prior HEP. Exercises were reviewed and Alen was able to demonstrate them prior to the end of the session.  Odonnell demonstrated fair  understanding of the education provided. See EMR under Patient Instructions for exercises provided during therapy sessions    ASSESSMENT     Alen is a 66 y.o. male referred to outpatient Physical Therapy with a medical diagnosis of lumbar radiculopathy. Pt presents with low back and L LE pain, muscle tightness, and LE and core muscle weakness. Patient was provided with min verbal and tactile cueing for proper alignment and hold times while performing therapeutic exercises. Patient required frequent rest breaks due to complaints of soreness. Patient with no reports of increased pain during or after treatment session.     Pt's spiritual, cultural and educational needs considered and pt agreeable to plan of care and goals.     Anticipated barriers to physical therapy: chronicity of pain, guarded with exercise, and sedentary lifestyle.    Goals:  1. Patient will report decrease in pain to 3/10 to improve quality of life.   2. Patient will report decrease in radiating occurrences from L foot  to L thigh  to allow patient the ability to perform activities of daily living   3. Patient will increase B lower extremity strength to 4+/5 to improve ambulation ability  4. Patient will increase B hamstring 90/90 by 10 degrees      PLAN     Continue POC per PT order to progress patient toward rehab goals as tolerated by patient.    Alina Hanley, PTA 2/4/2022

## 2022-02-08 ENCOUNTER — CLINICAL SUPPORT (OUTPATIENT)
Dept: REHABILITATION | Facility: HOSPITAL | Age: 67
End: 2022-02-08
Payer: MEDICARE

## 2022-02-08 DIAGNOSIS — G89.29 CHRONIC BILATERAL LOW BACK PAIN WITH LEFT-SIDED SCIATICA: ICD-10-CM

## 2022-02-08 DIAGNOSIS — M54.42 CHRONIC BILATERAL LOW BACK PAIN WITH LEFT-SIDED SCIATICA: ICD-10-CM

## 2022-02-08 PROCEDURE — 97014 ELECTRIC STIMULATION THERAPY: CPT | Mod: CQ

## 2022-02-08 PROCEDURE — 97140 MANUAL THERAPY 1/> REGIONS: CPT | Mod: CQ

## 2022-02-08 PROCEDURE — 97110 THERAPEUTIC EXERCISES: CPT | Mod: CQ

## 2022-02-08 NOTE — PROGRESS NOTES
"[facility]   Physical Therapy Treatment Note     Name: Alen Lam  Clinic Number: [id number]    Therapy Diagnosis:   Encounter Diagnosis   Name Primary?    Chronic bilateral low back pain with left-sided sciatica      Physician: Carlota Murray FNP  Physician Orders: PT Eval and Treat   Medical Diagnosis from Referral: low back pain   Evaluation Date: 1/5/2022  Authorization Period Expiration: 1/5/2023  Plan of Care Expiration: 2/16/2022  Visit # / Visits authorized: 9/12     Time In: 9:02  Time Out: 10:15  Total Appointment Time (timed & untimed codes): 73 billable      Precautions: Standard    Visit Date: 2/8/2022    PTA Visit #: 8/5        SUBJECTIVE     Pt reports: Patient stated that he is having pain in his low back and L hip.       He was compliant with home exercise program.  Response to previous treatment: No adverse effects noted  Functional change: Patient stated, "I'm not having as much back pain as I used to."    Pain: 4/10  Location: Left Hip, Left lateral thigh to knee, and low back    OBJECTIVE     Objective Measures updated at progress report unless specified. Case conference with Flaquito Lobo DPT regarding patient's POC. LPTA instructed to continue current POC.     Treatment     Alen received the treatments listed below:      THERAPEUTIC EXERCISES to develop flexibility, posture and core stabilization. See flowsheet below:     Ther-Ex Reps    Bike 10 mins   Wedge 2 minutes    Hamstring Stretch 3 x 30"  each    Posterior Pelvic Tilts 30 x 3"    Lower Trunk Rotations 5 x 10" each side   Single Knee to Chest Stretch 5 x 10" each side    Piriformis Stretch 3 x 30" each side    Figure 4 Stretch 3 x 30" each side    Hip Adduction 30 x 3"   Hip Abduction  20 x 2 red TB (Not today)   Seated 3 way roll w/ thera ball 5 x 10" each side (Not today)   Supine Hamstring Stretch  3 x 30" using Stretch Rite    Bridges 2 x 10            SUPERVISED MODALITIES after being cleared " for contraindications: TENS: Alen received Biphasic electrical stimulation for post exercises irritation to the B lumbosacral muscles. Pt received burst mode at a rate of 2 pps for 10 minutes.    Hot Pack for 10 minutes to B lumbosacral muscles with ESTIM.     MANUAL THERAPY:  STM/MFR to Left IT Band and TFL to decrease adhesions and MYF tightntess. ANRs performed to L quadratus lumborum to decrease musucle tightness.       Patient Education and Home Exercises     Home Exercises Provided and Patient Education Provided:       Written Home Exercises Provided: Patient instructed to cont prior HEP. Exercises were reviewed and Alen was able to demonstrate them prior to the end of the session.  Alen demonstrated fair  understanding of the education provided. See EMR under Patient Instructions for exercises provided during therapy sessions    ASSESSMENT     Alen is a 66 y.o. male referred to outpatient Physical Therapy with a medical diagnosis of lumbar radiculopathy. Pt presents with low back and L LE pain, muscle tightness, and LE and core muscle weakness. Patient was provided with min verbal and tactile cueing for proper alignment and hold times while performing therapeutic exercises. Patient required occasional rest breaks due to fatigue. Patient with no reports of increased pain during or after treatment session. Patient stated that he felt better after treatment.     Pt's spiritual, cultural and educational needs considered and pt agreeable to plan of care and goals.     Anticipated barriers to physical therapy: chronicity of pain, guarded with exercise, and sedentary lifestyle.    Goals:  1. Patient will report decrease in pain to 3/10 to improve quality of life.   2. Patient will report decrease in radiating occurrences from L foot  to L thigh  to allow patient the ability to perform activities of daily living   3. Patient will increase B lower extremity strength to 4+/5 to improve ambulation  ability  4. Patient will increase B hamstring 90/90 by 10 degrees      PLAN     Continue POC per PT order to progress patient toward rehab goals as tolerated by patient.    Alina Hanley, PTA 2/8/2022

## 2022-02-10 ENCOUNTER — DOCUMENTATION ONLY (OUTPATIENT)
Dept: REHABILITATION | Facility: HOSPITAL | Age: 67
End: 2022-02-10
Payer: MEDICARE

## 2022-02-10 ENCOUNTER — CLINICAL SUPPORT (OUTPATIENT)
Dept: REHABILITATION | Facility: HOSPITAL | Age: 67
End: 2022-02-10
Payer: MEDICARE

## 2022-02-10 DIAGNOSIS — G89.29 CHRONIC BILATERAL LOW BACK PAIN WITH LEFT-SIDED SCIATICA: ICD-10-CM

## 2022-02-10 DIAGNOSIS — M54.42 CHRONIC BILATERAL LOW BACK PAIN WITH LEFT-SIDED SCIATICA: ICD-10-CM

## 2022-02-10 PROCEDURE — 97014 ELECTRIC STIMULATION THERAPY: CPT

## 2022-02-10 PROCEDURE — 97110 THERAPEUTIC EXERCISES: CPT

## 2022-02-10 NOTE — PROGRESS NOTES
"[facility]   Physical Therapy Treatment Note     Name: Alen Lam  Clinic Number: [id number]    Therapy Diagnosis:   Encounter Diagnosis   Name Primary?    Chronic bilateral low back pain with left-sided sciatica      Physician: Carlota Murray FNP  Physician Orders: PT Eval and Treat   Medical Diagnosis from Referral: low back pain   Evaluation Date: 1/5/2022  Authorization Period Expiration: 1/5/2023  Plan of Care Expiration: 2/16/2022  Visit # / Visits authorized: 10/12     Time In: 9:13  Time Out: 10:10  Total Appointment Time (timed & untimed codes): 57 minutes billable      Precautions: Standard    Visit Date: 2/10/2022    PTA Visit #: 0/5        SUBJECTIVE     Pt reports: "I had a rough night. I had to get up and take a pain pill."       He was compliant with home exercise program.  Response to previous treatment: No adverse effects noted  Functional change: Patient stated, "I'm not having as much back pain as I used to."    Pain: 4/10  Location: Left Hip, Left lateral thigh to knee, and low back    OBJECTIVE     Objective Measures updated at progress report unless specified. Case conference with Flaquito Lobo DPT regarding patient's POC. LPTA instructed to continue current POC.     Treatment     Alen received the treatments listed below:      THERAPEUTIC EXERCISES to develop flexibility, posture and core stabilization. See flowsheet below:     Ther-Ex Reps    Bike 10 mins   Wedge 2 minutes    Hamstring Stretch 3 x 30"  each    Posterior Pelvic Tilts 30 x 3"    Lower Trunk Rotations 5 x 10" each side   Single Knee to Chest Stretch 5 x 10" each side    Piriformis Stretch 3 x 30" each side    Figure 4 Stretch 3 x 30" each side    Hip Adduction 30 x 3"   Hip Abduction  20 x 2 red TB (Not today)   Seated 3 way roll w/ thera ball 5 x 10" each side (Not today)   Supine Hamstring Stretch  3 x 30" using Stretch Rite    Bridges 2 x 10            SUPERVISED MODALITIES after being " cleared for contraindications: TENS: Alen received Biphasic electrical stimulation for post exercises irritation to the B lumbosacral muscles. Pt received burst mode at a rate of 2 pps for 10 minutes.    Hot Pack for 10 minutes to B lumbosacral muscles with ESTIM.       Patient Education and Home Exercises     Home Exercises Provided and Patient Education Provided:       Written Home Exercises Provided: Patient instructed to cont prior HEP. Exercises were reviewed and Alen was able to demonstrate them prior to the end of the session.  [place] demonstrated fair  understanding of the education provided. See EMR under Patient Instructions for exercises provided during therapy sessions    ASSESSMENT     Alen is a 66 y.o. male referred to outpatient Physical Therapy with a medical diagnosis of lumbar radiculopathy. Pt presents with low back and L LE pain, muscle tightness, and LE and core muscle weakness. Patient was provided with min verbal and tactile cueing for proper alignment and hold times throughout treatment to ensure proper understanding for continuation at home with HEP. PT completed patient goal assessment for last scheduled visit. Patient has no significant lasting improvements in pain. PT and patient agree to DC at this time with patient returning to [place] for further treatment options.   Pt's spiritual, cultural and educational needs considered and pt agreeable to plan of care and goals.     Anticipated barriers to physical therapy: chronicity of pain, guarded with exercise, and sedentary lifestyle.    Goals:  1. Patient will report decrease in pain to 3/10 to improve quality of life. -Partially met; patient's pain fluctuates from day to day .   2. Patient will report decrease in radiating occurrences from L foot  to L thigh  to allow patient the ability to perform activities of daily living - not met however, patient reports decreased occurences of radiating pain.   3. Patient will increase B lower  extremity strength to 4+/5 to improve ambulation ability- Met   4. Patient will increase B hamstring 90/90 by 10 degrees- Not met       PLAN     D/C to HEP at this time.   Flaquito Lobo, PT, DPT   2/10/2022

## 2022-02-10 NOTE — PROGRESS NOTES
Outpatient Therapy Discharge Summary   Name: Alen Lam  [facility] Number: [id number]     Therapy Diagnosis:        Encounter Diagnosis   Name Primary?    Chronic bilateral low back pain with left-sided sciatica        Physician: Carlota Murray FNP  Physician Orders: PT Eval and Treat   Medical Diagnosis from Referral: low back pain   Evaluation Date: 1/5/2022  Authorization Period Expiration: 1/5/2023  Plan of Care Expiration: 2/16/2022  Date of Last visit: 2/10/2022  Total Visits Received: 10  Cancelled Visits: 2  No Show Visits: 0    Assessment    Goals:  1. Patient will report decrease in pain to 3/10 to improve quality of life. -Partially met; patient's pain fluctuates from day to day .   2. Patient will report decrease in radiating occurrences from L foot  to L thigh  to allow patient the ability to perform activities of daily living - not met however, patient reports decreased occurences of radiating pain.   3. Patient will increase B lower extremity strength to 4+/5 to improve ambulation ability- Met   4. Patient will increase B hamstring 90/90 by 10 degrees- Not met     Discharge reason: Other:  Patient has completed PT POC and has not seen consistent improvements in pain at this time.     Plan   This patient is discharged from Physical Therapy.  Flaquito Lobo, PT, DPT

## 2024-08-12 ENCOUNTER — OFFICE VISIT (OUTPATIENT)
Dept: FAMILY MEDICINE | Facility: CLINIC | Age: 69
End: 2024-08-12
Payer: MEDICARE

## 2024-08-12 VITALS
OXYGEN SATURATION: 98 % | HEIGHT: 72 IN | SYSTOLIC BLOOD PRESSURE: 146 MMHG | HEART RATE: 67 BPM | BODY MASS INDEX: 26.68 KG/M2 | DIASTOLIC BLOOD PRESSURE: 68 MMHG | TEMPERATURE: 98 F | RESPIRATION RATE: 16 BRPM | WEIGHT: 197 LBS

## 2024-08-12 DIAGNOSIS — U07.1 COVID: Primary | ICD-10-CM

## 2024-08-12 DIAGNOSIS — R05.9 COUGH, UNSPECIFIED TYPE: ICD-10-CM

## 2024-08-12 DIAGNOSIS — R09.81 SINUS CONGESTION: ICD-10-CM

## 2024-08-12 PROBLEM — J06.9 VIRAL URI WITH COUGH: Status: ACTIVE | Noted: 2024-08-12

## 2024-08-12 LAB
CTP QC/QA: YES
CTP QC/QA: YES
POC MOLECULAR INFLUENZA A AGN: NEGATIVE
POC MOLECULAR INFLUENZA B AGN: NEGATIVE
SARS-COV-2 RDRP RESP QL NAA+PROBE: POSITIVE

## 2024-08-12 PROCEDURE — 1101F PT FALLS ASSESS-DOCD LE1/YR: CPT | Mod: ,,, | Performed by: SPECIALIST

## 2024-08-12 PROCEDURE — 3288F FALL RISK ASSESSMENT DOCD: CPT | Mod: ,,, | Performed by: SPECIALIST

## 2024-08-12 PROCEDURE — 99203 OFFICE O/P NEW LOW 30 MIN: CPT | Mod: GC,,, | Performed by: SPECIALIST

## 2024-08-12 PROCEDURE — 87635 SARS-COV-2 COVID-19 AMP PRB: CPT | Mod: QW,GC,, | Performed by: SPECIALIST

## 2024-08-12 PROCEDURE — 4010F ACE/ARB THERAPY RXD/TAKEN: CPT | Mod: ,,, | Performed by: SPECIALIST

## 2024-08-12 PROCEDURE — 1159F MED LIST DOCD IN RCRD: CPT | Mod: ,,, | Performed by: SPECIALIST

## 2024-08-12 PROCEDURE — 3077F SYST BP >= 140 MM HG: CPT | Mod: ,,, | Performed by: SPECIALIST

## 2024-08-12 PROCEDURE — 3008F BODY MASS INDEX DOCD: CPT | Mod: ,,, | Performed by: SPECIALIST

## 2024-08-12 PROCEDURE — 3078F DIAST BP <80 MM HG: CPT | Mod: ,,, | Performed by: SPECIALIST

## 2024-08-12 RX ORDER — SITAGLIPTIN 100 MG/1
100 TABLET ORAL DAILY
COMMUNITY
Start: 2024-07-29

## 2024-08-12 RX ORDER — CYCLOBENZAPRINE HCL 10 MG
10 TABLET ORAL 2 TIMES DAILY
COMMUNITY
Start: 2024-04-25

## 2024-08-12 RX ORDER — ALBUTEROL SULFATE 90 UG/1
INHALANT RESPIRATORY (INHALATION)
COMMUNITY
Start: 2024-02-19

## 2024-08-12 RX ORDER — CEPHALEXIN 500 MG/1
CAPSULE ORAL
COMMUNITY
Start: 2024-02-19

## 2024-08-12 RX ORDER — FLUTICASONE PROPIONATE 50 MCG
1 SPRAY, SUSPENSION (ML) NASAL DAILY
Qty: 11.1 ML | Refills: 0 | Status: SHIPPED | OUTPATIENT
Start: 2024-08-12

## 2024-08-12 RX ORDER — PREDNISONE 10 MG/1
TABLET ORAL
COMMUNITY
Start: 2024-02-19

## 2024-08-12 RX ORDER — CIPROFLOXACIN 500 MG/1
TABLET ORAL
COMMUNITY
Start: 2024-04-25

## 2024-08-12 NOTE — PROGRESS NOTES
Subjective:       Patient ID: Alen Lam is a 69 y.o. male.    Chief Complaint: No chief complaint on file.    Patient is a 70 yo M who presents to the clinic with complaints of dry cough, nasal congestion and sinus pressure that has been ongoing since last Thursday. Patient reports he is a patient of Dr. Graves and was not able to get in to be seen today. Patient reports he had some chills on the first day but has not had any since and no fevers.       Current Outpatient Medications:     albuterol (VENTOLIN HFA) 90 mcg/actuation inhaler, 2 puffs Inhalation three times a day for 20 days, Disp: , Rfl:     cephALEXin (KEFLEX) 500 MG capsule, , Disp: , Rfl:     chlorpheniramine-phenyleph-DM 4-10-15 mg/5 mL Liqd, 5 ml Orally three times a day for 10 days, Disp: , Rfl:     ciprofloxacin HCl (CIPRO) 500 MG tablet, , Disp: , Rfl:     cyclobenzaprine (FLEXERIL) 10 MG tablet, Take 10 mg by mouth 2 (two) times daily., Disp: , Rfl:     predniSONE (DELTASONE) 10 MG tablet, , Disp: , Rfl:     SITagliptin 100 mg Tab, Take 100 mg by mouth Daily., Disp: , Rfl:     acetaminophen-codeine 300-30mg (TYLENOL #3) 300-30 mg Tab, , Disp: , Rfl:     alogliptin (NESINA) 25 mg Tab, , Disp: , Rfl:     aspirin (ECOTRIN) 81 MG EC tablet, , Disp: , Rfl:     gabapentin (NEURONTIN) 300 MG capsule, , Disp: , Rfl:     JARDIANCE 25 mg tablet, , Disp: , Rfl:     LIDOcaine (LIDODERM) 5 %, , Disp: , Rfl:     LINZESS 145 mcg Cap capsule, , Disp: , Rfl:     lisinopriL-hydrochlorothiazide (PRINZIDE,ZESTORETIC) 20-25 mg Tab, , Disp: , Rfl:     TRESIBA FLEXTOUCH U-200 200 unit/mL (3 mL) insulin pen, , Disp: , Rfl:     vitamin D (VITAMIN D3) 1000 units Tab, , Disp: , Rfl:     Review of patient's allergies indicates:   Allergen Reactions    Metformin Diarrhea       Past Medical History:   Diagnosis Date    Diabetes mellitus     Hypertension     Stroke        Past Surgical History:   Procedure Laterality Date    CHOLECYSTECTOMY         History reviewed. No  pertinent family history.    Social History     Tobacco Use    Smoking status: Never    Smokeless tobacco: Never   Substance Use Topics    Alcohol use: Never    Drug use: Never       Review of Systems   Constitutional:  Positive for fatigue. Negative for chills and fever.   HENT:  Positive for nasal congestion, sinus pressure/congestion and sore throat.    Respiratory:  Positive for cough. Negative for shortness of breath.    Cardiovascular:  Negative for leg swelling.   Neurological:  Negative for numbness and headaches.       Current Medications:   Medication List with Changes/Refills   Current Medications    ACETAMINOPHEN-CODEINE 300-30MG (TYLENOL #3) 300-30 MG TAB           Start Date: 12/10/2021End Date: --    ALBUTEROL (VENTOLIN HFA) 90 MCG/ACTUATION INHALER    2 puffs Inhalation three times a day for 20 days       Start Date: 2/19/2024 End Date: --    ALOGLIPTIN (NESINA) 25 MG TAB           Start Date: 10/22/2021End Date: --    ASPIRIN (ECOTRIN) 81 MG EC TABLET           Start Date: 8/3/2021  End Date: --    CEPHALEXIN (KEFLEX) 500 MG CAPSULE           Start Date: 2/19/2024 End Date: --    CHLORPHENIRAMINE-PHENYLEPH-DM 4-10-15 MG/5 ML LIQD    5 ml Orally three times a day for 10 days       Start Date: 2/19/2024 End Date: --    CIPROFLOXACIN HCL (CIPRO) 500 MG TABLET           Start Date: 4/25/2024 End Date: --    CYCLOBENZAPRINE (FLEXERIL) 10 MG TABLET    Take 10 mg by mouth 2 (two) times daily.       Start Date: 4/25/2024 End Date: --    GABAPENTIN (NEURONTIN) 300 MG CAPSULE           Start Date: 8/2/2021  End Date: --    JARDIANCE 25 MG TABLET           Start Date: 12/7/2021 End Date: --    LIDOCAINE (LIDODERM) 5 %           Start Date: 8/2/2021  End Date: --    LINZESS 145 MCG CAP CAPSULE           Start Date: 9/21/2021 End Date: --    LISINOPRIL-HYDROCHLOROTHIAZIDE (PRINZIDE,ZESTORETIC) 20-25 MG TAB           Start Date: 11/9/2021 End Date: --    PREDNISONE (DELTASONE) 10 MG TABLET           Start Date:  2/19/2024 End Date: --    SITAGLIPTIN 100 MG TAB    Take 100 mg by mouth Daily.       Start Date: 7/29/2024 End Date: --    TRESIBA FLEXTOUCH U-200 200 UNIT/ML (3 ML) INSULIN PEN           Start Date: 11/16/2021End Date: --    VITAMIN D (VITAMIN D3) 1000 UNITS TAB           Start Date: 12/7/2021 End Date: --            Objective:        There were no vitals filed for this visit.    Physical Exam  Vitals and nursing note reviewed.   Constitutional:       General: He is not in acute distress.     Appearance: Normal appearance. He is normal weight.   HENT:      Head: Normocephalic.      Right Ear: Tympanic membrane, ear canal and external ear normal.      Left Ear: Tympanic membrane, ear canal and external ear normal.      Nose: Congestion present.      Mouth/Throat:      Mouth: Mucous membranes are moist.      Pharynx: No oropharyngeal exudate or posterior oropharyngeal erythema.   Eyes:      Extraocular Movements: Extraocular movements intact.      Conjunctiva/sclera: Conjunctivae normal.   Cardiovascular:      Rate and Rhythm: Normal rate and regular rhythm.      Pulses: Normal pulses.      Heart sounds: Normal heart sounds.   Pulmonary:      Effort: Pulmonary effort is normal.      Breath sounds: Normal breath sounds.   Skin:     General: Skin is warm.   Neurological:      Mental Status: He is alert and oriented to person, place, and time.   Psychiatric:         Mood and Affect: Mood normal.         Behavior: Behavior normal.         Thought Content: Thought content normal.         Judgment: Judgment normal.           Lab Results   Component Value Date    WBC 8.36 01/04/2022    HGB 15.4 01/04/2022    HCT 48.7 01/04/2022     (L) 01/04/2022    ALT 24 01/04/2022    AST 20 01/04/2022     01/04/2022    K 4.0 01/04/2022     01/04/2022    CREATININE 1.32 (H) 01/04/2022    BUN 12 01/04/2022    CO2 29 01/04/2022      Assessment:       1. COVID    2. Sinus congestion    3. Cough, unspecified type         Plan:         Problem List Items Addressed This Visit          ID    COVID - Primary     Patient tested positive for COVID, will start him on some flonase to help with his sinus congestion, recommend that he take it easy for the next couple of days, have him follow up in 1 week or sooner if not better. Patient was agreeable with this plan.           Other Visit Diagnoses       Sinus congestion        Relevant Medications    fluticasone propionate (FLONASE) 50 mcg/actuation nasal spray    Cough, unspecified type        Relevant Orders    POCT COVID-19 Rapid Screening    POCT Influenza A/B Molecular              Follow up in about 1 week (around 8/19/2024).    Edgard Petersen DO     Instructed patient that if symptoms fail to improve or worsen patient should seek immediate medical attention or report to the nearest emergency department. Patient expressed verbal agreement and understanding to this plan of care.

## 2024-08-12 NOTE — ASSESSMENT & PLAN NOTE
Patient tested positive for COVID, will start him on some flonase to help with his sinus congestion, recommend that he take it easy for the next couple of days, have him follow up in 1 week or sooner if not better. Patient was agreeable with this plan.

## 2024-08-15 ENCOUNTER — OFFICE VISIT (OUTPATIENT)
Dept: FAMILY MEDICINE | Facility: CLINIC | Age: 69
End: 2024-08-15
Payer: OTHER GOVERNMENT

## 2024-08-15 VITALS
TEMPERATURE: 98 F | DIASTOLIC BLOOD PRESSURE: 88 MMHG | BODY MASS INDEX: 25.87 KG/M2 | OXYGEN SATURATION: 99 % | SYSTOLIC BLOOD PRESSURE: 158 MMHG | HEIGHT: 72 IN | WEIGHT: 191 LBS | RESPIRATION RATE: 18 BRPM | HEART RATE: 70 BPM

## 2024-08-15 DIAGNOSIS — U07.1 COVID: Primary | ICD-10-CM

## 2024-08-15 NOTE — ASSESSMENT & PLAN NOTE
Patient tested positive for COVID on Monday, 8/12/24, and started on Flonase plus Delsym. Reports rhinorrhea and non-productive cough. Denies any fever, chills, sob, or sinusitis. Exam today patient is afebrile with normal RR. Positive for rhinorrhea. No throat non-erythematous, no exudate, XAVIER, and were lungs sound CTAB. Has rhinorrhea. Recommended continue current medical management and follow up if symptoms persist or worsen.

## 2024-08-15 NOTE — PROGRESS NOTES
Subjective:       Patient ID: Alen Lam is a 69 y.o. male.    Chief Complaint: No chief complaint on file.    Patient is a 69 year old male with a PMH of essential hypertension, type 2 DM, and stroke. Presented to clinic on 8/12/24 with upper respiratory symptoms. Found to be covid positive. Treated with Flonase and Delsym. He reports that he is feeling better today. Denies any fever, chills, sob, chest pain, sinus pressure/pain, or body aches. His cough is well controlled on cough suppressant. Continued rhinorrhea.            Current Outpatient Medications:     acetaminophen-codeine 300-30mg (TYLENOL #3) 300-30 mg Tab, , Disp: , Rfl:     albuterol (VENTOLIN HFA) 90 mcg/actuation inhaler, 2 puffs Inhalation three times a day for 20 days (Patient not taking: Reported on 8/12/2024), Disp: , Rfl:     alogliptin (NESINA) 25 mg Tab, , Disp: , Rfl:     aspirin (ECOTRIN) 81 MG EC tablet, , Disp: , Rfl:     cephALEXin (KEFLEX) 500 MG capsule, , Disp: , Rfl:     chlorpheniramine-phenyleph-DM 4-10-15 mg/5 mL Liqd, 5 ml Orally three times a day for 10 days (Patient not taking: Reported on 8/12/2024), Disp: , Rfl:     ciprofloxacin HCl (CIPRO) 500 MG tablet, , Disp: , Rfl:     cyclobenzaprine (FLEXERIL) 10 MG tablet, Take 10 mg by mouth 2 (two) times daily. (Patient not taking: Reported on 8/12/2024), Disp: , Rfl:     fluticasone propionate (FLONASE) 50 mcg/actuation nasal spray, 1 spray (50 mcg total) by Each Nostril route once daily., Disp: 11.1 mL, Rfl: 0    gabapentin (NEURONTIN) 300 MG capsule, , Disp: , Rfl:     JARDIANCE 25 mg tablet, , Disp: , Rfl:     LIDOcaine (LIDODERM) 5 %, , Disp: , Rfl:     LINZESS 145 mcg Cap capsule, , Disp: , Rfl:     lisinopriL-hydrochlorothiazide (PRINZIDE,ZESTORETIC) 20-25 mg Tab, , Disp: , Rfl:     predniSONE (DELTASONE) 10 MG tablet, , Disp: , Rfl:     SITagliptin 100 mg Tab, Take 100 mg by mouth Daily., Disp: , Rfl:     TRESIBA FLEXTOUCH U-200 200 unit/mL (3 mL) insulin pen, , Disp:  , Rfl:     vitamin D (VITAMIN D3) 1000 units Tab, , Disp: , Rfl:     Review of patient's allergies indicates:   Allergen Reactions    Metformin Diarrhea       Past Medical History:   Diagnosis Date    Diabetes mellitus     Hypertension     Stroke        Past Surgical History:   Procedure Laterality Date    CHOLECYSTECTOMY         No family history on file.    Social History     Tobacco Use    Smoking status: Never    Smokeless tobacco: Never   Substance Use Topics    Alcohol use: Never    Drug use: Never       Review of Systems   Constitutional:  Negative for chills, diaphoresis, fatigue and fever.   HENT:  Positive for rhinorrhea. Negative for nasal congestion, postnasal drip, sinus pressure/congestion, sneezing and sore throat.    Respiratory:  Positive for cough. Negative for shortness of breath, wheezing and stridor.    Cardiovascular: Negative.    Allergic/Immunologic: Negative for environmental allergies and frequent infections.           Objective:      There were no vitals filed for this visit.  Physical Exam  Vitals reviewed.   Constitutional:       General: He is not in acute distress.     Appearance: Normal appearance. He is not ill-appearing.   HENT:      Nose: Rhinorrhea present. No congestion.      Mouth/Throat:      Mouth: Mucous membranes are moist.      Pharynx: Oropharynx is clear.   Eyes:      Conjunctiva/sclera: Conjunctivae normal.   Cardiovascular:      Rate and Rhythm: Normal rate and regular rhythm.      Pulses: Normal pulses.      Heart sounds: Normal heart sounds.   Pulmonary:      Effort: Pulmonary effort is normal.      Breath sounds: Normal breath sounds. No wheezing, rhonchi or rales.   Skin:     General: Skin is warm and dry.   Neurological:      Mental Status: He is alert.           Lab Results   Component Value Date    WBC 8.36 01/04/2022    HGB 15.4 01/04/2022    HCT 48.7 01/04/2022     (L) 01/04/2022    ALT 24 01/04/2022    AST 20 01/04/2022     01/04/2022    K 4.0  01/04/2022     01/04/2022    CREATININE 1.32 (H) 01/04/2022    BUN 12 01/04/2022    CO2 29 01/04/2022      Assessment:       1. COVID        Plan:         Problem List Items Addressed This Visit          ID    COVID - Primary     Patient tested positive for COVID on Monday, 8/12/24, and started on Flonase plus Delsym. Reports rhinorrhea and non-productive cough. Denies any fever, chills, sob, or sinusitis. Exam today patient is afebrile with normal RR. Positive for rhinorrhea. No throat non-erythematous, no exudate, XAVIER, and were lungs sound CTAB. Has rhinorrhea. Recommended continue current medical management and follow up if symptoms persist or worsen.               Follow up if symptoms worsen or fail to improve.    Minna Sandoval MD     Instructed patient that if symptoms fail to improve or worsen patient should seek immediate medical attention or report to the nearest emergency department. Patient expressed verbal agreement and understanding to this plan of care.